# Patient Record
Sex: FEMALE | Race: WHITE | URBAN - NONMETROPOLITAN AREA
[De-identification: names, ages, dates, MRNs, and addresses within clinical notes are randomized per-mention and may not be internally consistent; named-entity substitution may affect disease eponyms.]

---

## 2020-09-22 ENCOUNTER — OFFICE VISIT (OUTPATIENT)
Dept: CARDIOLOGY CLINIC | Age: 60
End: 2020-09-22
Payer: COMMERCIAL

## 2020-09-22 VITALS
DIASTOLIC BLOOD PRESSURE: 64 MMHG | HEART RATE: 80 BPM | WEIGHT: 134.8 LBS | SYSTOLIC BLOOD PRESSURE: 122 MMHG | HEIGHT: 65 IN | BODY MASS INDEX: 22.46 KG/M2

## 2020-09-22 PROCEDURE — 99204 OFFICE O/P NEW MOD 45 MIN: CPT | Performed by: NUCLEAR MEDICINE

## 2020-09-22 RX ORDER — CYCLOBENZAPRINE HCL 10 MG
5 TABLET ORAL
COMMUNITY
Start: 2020-07-23

## 2020-09-22 RX ORDER — TRIAMCINOLONE ACETONIDE 1 MG/G
CREAM TOPICAL 2 TIMES DAILY
COMMUNITY
End: 2022-04-05 | Stop reason: ALTCHOICE

## 2020-09-22 RX ORDER — M-VIT,TX,IRON,MINS/CALC/FOLIC 27MG-0.4MG
1 TABLET ORAL DAILY
COMMUNITY

## 2020-09-22 RX ORDER — FEXOFENADINE HCL 180 MG/1
180 TABLET ORAL DAILY
COMMUNITY

## 2020-09-22 RX ORDER — PANTOPRAZOLE SODIUM 40 MG/1
40 TABLET, DELAYED RELEASE ORAL DAILY
COMMUNITY
Start: 2020-08-29

## 2020-09-22 RX ORDER — OMEGA-3/DHA/EPA/FISH OIL 60 MG-90MG
1500 CAPSULE ORAL DAILY
COMMUNITY

## 2020-09-22 RX ORDER — THIAMINE HCL 100 MG
TABLET ORAL DAILY
COMMUNITY

## 2020-09-22 ASSESSMENT — ENCOUNTER SYMPTOMS
NAUSEA: 0
CONSTIPATION: 0
ANAL BLEEDING: 0
SHORTNESS OF BREATH: 1
RECTAL PAIN: 0
DIARRHEA: 0
VOMITING: 0
BACK PAIN: 0
ABDOMINAL PAIN: 0
ABDOMINAL DISTENTION: 0
BLOOD IN STOOL: 0
COLOR CHANGE: 0
CHEST TIGHTNESS: 0
PHOTOPHOBIA: 0

## 2020-09-22 NOTE — PROGRESS NOTES
[address] [place] [address],[address] [address] [facility]  Dept: [phone]  Dept Fax: [phone]  Loc: [phone]    Visit Date: 9/22/2020    Estefani Rojas is a 56 y.o. female who presents todayfor:  Chief Complaint   Patient presents with    New Patient    Establish Cardiologist    Palpitations   here for the first time  Started having palpitation   Been going on for few years  Might have gotten worse lately   Was on beta blockers for migraine  Then was stopped  Lately had an echo   Was normal   Mild valve issues  holter with occasional to rare PACs and PVCs  Palpitation is not frequent   Not too bad on caffeine  Not to bad on alcohol  No known CAD  No smoking         HPI:  HPI  Past Medical History:   Diagnosis Date    Cancer ([address]      skin; melanoma in situ 1984, 2009. Early lymph node -    Migraines       Past Surgical History:   Procedure Laterality Date    BUNIONECTOMY Left 2000    COLONOS[facility]      [facility] age 46    ENDOSCOPY, COLON, DIAGNOSTIC      TONSILLECTOMY      age 9     Family History   Problem Relation Age of Onset    Stroke Father     Heart Disease Brother     Other Brother         anxiety     Social History     Tobacco Use    Smoking status: Never Smoker    Smokeless tobacco: Never Used   Substance Use Topics    Alcohol use: Yes     Comment: rarely, 2/month      Current Outpatient Medications   Medication Sig Dispense Refill    Magnesium 500 MG TABS Take by mouth daily      Omega-3 Fatty Acids (FISH OIL) 500 MG CAPS Take 1,500 mg by mouth daily      Multiple Vitamins-Minerals (THERAPEUTIC MULTIVITAMIN-MINERALS) tablet Take 1 tablet by mouth daily      triamcinolone (KENALOG) 0.1 % cream Apply topically 2 times daily Apply topically 2 times daily.       cyclobenzaprine (FLEXERIL) 10 MG tablet Take 5 mg by mouth       TRIAMCINOLONE ACETONIDE,NASAL, NA 55 mcg by Nasal route 2 times daily      fexofenadine (ALLEGRA) 180 MG tablet Take 180 mg by mouth daily      pantoprazole (PROTONIX) 40 MG tablet Take 40 mg by mouth daily       metoprolol tartrate (LOPRESSOR) 25 MG tablet Take 12.5 mg by mouth 2 times daily       NONFORMULARY synbiotic 365      NONFORMULARY Enzyme 365      NONFORMULARY Gut connect 365      conjugated estrogens (PREMARIN) 0.625 MG/GM vaginal cream Place vaginally daily Place vaginally daily. No current facility-administered medications for this visit. Allergies   Allergen Reactions    Flagyl [Metronidazole] Rash     Health Maintenance   Topic Date Due    Hepatitis C screen  1960    HIV screen  05/24/1973    DTaP/Tdap/Td vaccine (1 - Tdap) 05/24/1977    Cervical cancer screen  05/24/1979    Lipid screen  05/24/1998    Breast cancer screen  05/24/2008    Shingles Vaccine (1 of 2) 05/24/2008    Colon cancer screen colonoscopy  05/24/2008    Flu vaccine (1) 09/01/2020    Hepatitis A vaccine  Aged Out    Hepatitis B vaccine  Aged Out    Hib vaccine  Aged Out    Meningococcal (ACWY) vaccine  Aged Out    Pneumococcal 0-64 years Vaccine  Aged Out       Subjective:  Review of Systems   Constitutional: Positive for fatigue. Negative for chills and diaphoresis. HENT: Negative for ear pain and nosebleeds. Eyes: Negative for photophobia. Respiratory: Positive for shortness of breath. Negative for chest tightness. Cardiovascular: Positive for palpitations. Negative for chest pain. Gastrointestinal: Negative for abdominal distention, abdominal pain, anal bleeding, blood in stool, constipation, diarrhea, nausea, rectal pain and vomiting. Endocrine: Negative for polyphagia. Genitourinary: Negative for dysuria, hematuria and urgency. Musculoskeletal: Negative for arthralgias, back pain, gait problem, joint swelling, myalgias, neck pain and neck stiffness. Skin: Negative for color change, pallor, rash and wound. Allergic/Immunologic: Negative for food allergies.    Neurological: file.  Discussed   Cut down caffeine  No indication for more testing   Monitor for now   No obvious arrhythmia  Will see in a year   Continue risk factor modification and medical management  Thank you for allowing me to participate in the care of your patient. Please don't hesitate to contact me regarding any further issues related to the patient care    Orders Placed:  No orders of the defined types were placed in this encounter. Medications Prescribed:  No orders of the defined types were placed in this encounter. Discussed use, benefit, and side effects of prescribed medications. All patient questions answered. Pt voicedunderstanding. Instructed to continue current medications, diet and exercise. Continue risk factor modification and medical management. Patient agreed with treatment plan. Follow up as directed.     Electronically signedby [address], MD on 9/22/2020 at 3:15 PM

## 2020-10-26 ENCOUNTER — NURSE ONLY (OUTPATIENT)
Dept: LAB | Age: 60
End: 2020-10-26

## 2020-12-18 ENCOUNTER — TELEPHONE (OUTPATIENT)
Dept: CARDIOLOGY CLINIC | Age: 60
End: 2020-12-18

## 2022-04-05 ENCOUNTER — OFFICE VISIT (OUTPATIENT)
Dept: CARDIOLOGY CLINIC | Age: 62
End: 2022-04-05
Payer: COMMERCIAL

## 2022-04-05 VITALS
HEIGHT: 65 IN | HEART RATE: 73 BPM | DIASTOLIC BLOOD PRESSURE: 80 MMHG | SYSTOLIC BLOOD PRESSURE: 126 MMHG | BODY MASS INDEX: 24.49 KG/M2 | WEIGHT: 147 LBS

## 2022-04-05 DIAGNOSIS — R00.2 PALPITATION: Primary | ICD-10-CM

## 2022-04-05 PROCEDURE — 99213 OFFICE O/P EST LOW 20 MIN: CPT | Performed by: NUCLEAR MEDICINE

## 2022-04-05 PROCEDURE — 93000 ELECTROCARDIOGRAM COMPLETE: CPT | Performed by: NUCLEAR MEDICINE

## 2022-04-05 RX ORDER — RABEPRAZOLE SODIUM 20 MG/1
TABLET, DELAYED RELEASE ORAL
COMMUNITY
Start: 2021-10-08

## 2022-04-05 NOTE — PROGRESS NOTES
[facility]  [address] [place] [address]  Dept: [phone]  Dept Fax: [phone]  Loc: [phone]    Visit Date: 4/5/2022    Kalie Aguayo is a 59 y.o. female who presents todayfor:  Chief Complaint   Patient presents with    Check-Up     EKG done today    Palpitations     Here for follow up   Had palpitation   Better now  No more issues  No chest pain per se  Some times heart the heart beats in the ears  Vascular screen was okay  No new symptoms other wise     HPI:  HPI  Past Medical History:   Diagnosis Date    [address] [address]      skin; melanoma in situ 1984, 2009. Early lymph node -    Migraines       Past Surgical History:   Procedure Laterality Date    BUNIONECTOMY Left 2000    COLONOSCOPY      [facility] age 46    ENDOSCOPY, COLON, DIAGNOSTIC      TONSILLECTOMY      age 9     Family History   Problem Relation Age of Onset    Stroke Father     Heart Disease Brother     Other Brother         anxiety     Social History     Tobacco Use    Smoking status: Never Smoker    Smokeless tobacco: Never Used   Substance Use Topics    Alcohol use: Yes     Comment: rarely, 2/month      Current Outpatient Medications   Medication Sig Dispense Refill    RABEprazole (ACIPHEX) 20 MG tablet Take by mouth      NONFORMULARY Clobetasol ointment      Probiotic Product (PROBIOTIC DAILY PO) Take by mouth      Omega-3 Fatty Acids (FISH OIL) 500 MG CAPS Take 1,500 mg by mouth daily      Multiple Vitamins-Minerals (THERAPEUTIC MULTIVITAMIN-MINERALS) tablet Take 1 tablet by mouth daily      cyclobenzaprine (FLEXERIL) 10 MG tablet Take 5 mg by mouth       fexofenadine (ALLEGRA) 180 MG tablet Take 180 mg by mouth daily      metoprolol tartrate (LOPRESSOR) 25 MG tablet Take 12.5 mg by mouth 2 times daily       conjugated estrogens (PREMARIN) 0.625 MG/GM vaginal cream Place vaginally daily Place vaginally daily.       Magnesium 500 MG TABS Take by mouth daily (Patient not taking: Reported on 4/5/2022)      pantoprazole (PROTONIX) 40 MG tablet Take 40 mg by mouth daily        No current facility-administered medications for this visit. Allergies   Allergen Reactions    Flagyl [Metronidazole] Rash     Health Maintenance   Topic Date Due    Hepatitis C screen  Never done    COVID-19 Vaccine (1) Never done    Depression Screen  Never done    HIV screen  Never done    Cervical cancer screen  Never done    Lipid screen  Never done    Breast cancer screen  Never done    Flu vaccine (Season Ended) 09/01/2022    DTaP/Tdap/Td vaccine (2 - Td or Tdap) 10/10/2028    Colorectal Cancer Screen  09/30/2030    Shingles Vaccine  Completed    Hepatitis A vaccine  Aged Out    Hepatitis B vaccine  Aged Out    Hib vaccine  Aged Out    Meningococcal (ACWY) vaccine  Aged Out    Pneumococcal 0-64 years Vaccine  Aged Out       Subjective:  Review of Systems  General:   No fever, no chills, No fatigue or weight loss  Pulmonary:    No dyspnea, no wheezing  Cardiac:    Denies recent chest pain,   GI:     No nausea or vomiting, no abdominal pain  Neuro:    No dizziness or light headedness,   Musculoskeletal:  No recent active issues  Extremities:   No edema, no obvious claudication       Objective:  Physical Exam  /80   Pulse 73   Ht 5' 4.5\" (1.638 m)   Wt 147 lb (66.7 kg)   BMI 24.84 kg/m²   General:   Well developed, well nourished  Lungs:   Clear to auscultation  Heart:    Normal S1 S2, Slight murmur. no rubs, no gallops  Abdomen:   Soft, non tender, no organomegalies, positive bowel sounds  Extremities:   No edema, no cyanosis, good peripheral pulses  Neurological:   Awake, alert, oriented. No obvious focal deficits  Musculoskelatal:  No obvious deformities    Assessment:      Diagnosis Orders   1. Palpitation  EKG 12 Lead   as above  Cardiac seems fair for now  No major symptoms  ECG in office was done today. I reviewed the ECG.  No acute findings      Plan:  No follow-ups on file. As above  Continue risk factor modification and medical management'  Thank you for allowing me to participate in the care of your patient. Please don't hesitate to contact me regarding any further issues related to the patient care    Orders Placed:  Orders Placed This Encounter   Procedures    EKG 12 Lead     Order Specific Question:   Reason for Exam?     Answer: Other       Medications Prescribed:  No orders of the defined types were placed in this encounter. Discussed use, benefit, and side effects of prescribed medications. All patient questions answered. Pt voicedunderstanding. Instructed to continue current medications, diet and exercise. Continue risk factor modification and medical management. Patient agreed with treatment plan. Follow up as directed.     Electronically signedby Sandra Mendez MD on 4/5/2022 at 8:14 AM

## 2023-05-03 ENCOUNTER — OFFICE (OUTPATIENT)
Dept: URBAN - METROPOLITAN AREA CLINIC 18 | Facility: CLINIC | Age: 63
End: 2023-05-03
Payer: COMMERCIAL

## 2023-05-03 VITALS
HEIGHT: 65 IN | WEIGHT: 149.2 LBS | HEART RATE: 61 BPM | SYSTOLIC BLOOD PRESSURE: 130 MMHG | DIASTOLIC BLOOD PRESSURE: 78 MMHG

## 2023-05-03 DIAGNOSIS — R10.13 EPIGASTRIC PAIN: ICD-10-CM

## 2023-05-03 DIAGNOSIS — K21.9 GASTRO-ESOPHAGEAL REFLUX DISEASE WITHOUT ESOPHAGITIS: ICD-10-CM

## 2023-05-03 DIAGNOSIS — R13.10 DYSPHAGIA, UNSPECIFIED: ICD-10-CM

## 2023-05-03 PROCEDURE — 99213 OFFICE O/P EST LOW 20 MIN: CPT

## 2023-05-09 ENCOUNTER — AMBULATORY SURGICAL CENTER (OUTPATIENT)
Dept: URBAN - METROPOLITAN AREA SURGERY 7 | Facility: SURGERY | Age: 63
End: 2023-05-09

## 2023-05-09 ENCOUNTER — OFFICE (OUTPATIENT)
Dept: URBAN - METROPOLITAN AREA PATHOLOGY 1 | Facility: PATHOLOGY | Age: 63
End: 2023-05-09

## 2023-05-09 VITALS
DIASTOLIC BLOOD PRESSURE: 68 MMHG | SYSTOLIC BLOOD PRESSURE: 143 MMHG | HEART RATE: 65 BPM | SYSTOLIC BLOOD PRESSURE: 141 MMHG | RESPIRATION RATE: 18 BRPM | SYSTOLIC BLOOD PRESSURE: 143 MMHG | DIASTOLIC BLOOD PRESSURE: 68 MMHG | RESPIRATION RATE: 11 BRPM | DIASTOLIC BLOOD PRESSURE: 79 MMHG | HEART RATE: 65 BPM | OXYGEN SATURATION: 96 % | HEART RATE: 68 BPM | OXYGEN SATURATION: 100 % | RESPIRATION RATE: 11 BRPM | SYSTOLIC BLOOD PRESSURE: 126 MMHG | RESPIRATION RATE: 14 BRPM | RESPIRATION RATE: 10 BRPM | DIASTOLIC BLOOD PRESSURE: 86 MMHG | SYSTOLIC BLOOD PRESSURE: 141 MMHG | HEART RATE: 55 BPM | OXYGEN SATURATION: 97 % | OXYGEN SATURATION: 97 % | HEART RATE: 68 BPM | WEIGHT: 149 LBS | HEIGHT: 65 IN | TEMPERATURE: 97 F | SYSTOLIC BLOOD PRESSURE: 159 MMHG | SYSTOLIC BLOOD PRESSURE: 140 MMHG | OXYGEN SATURATION: 96 % | SYSTOLIC BLOOD PRESSURE: 140 MMHG | SYSTOLIC BLOOD PRESSURE: 146 MMHG | RESPIRATION RATE: 16 BRPM | RESPIRATION RATE: 18 BRPM | SYSTOLIC BLOOD PRESSURE: 159 MMHG | HEART RATE: 63 BPM | RESPIRATION RATE: 12 BRPM | OXYGEN SATURATION: 98 % | RESPIRATION RATE: 16 BRPM | DIASTOLIC BLOOD PRESSURE: 86 MMHG | TEMPERATURE: 97 F | DIASTOLIC BLOOD PRESSURE: 89 MMHG | DIASTOLIC BLOOD PRESSURE: 92 MMHG | HEART RATE: 75 BPM | OXYGEN SATURATION: 100 % | HEART RATE: 57 BPM | HEART RATE: 56 BPM | SYSTOLIC BLOOD PRESSURE: 126 MMHG | HEART RATE: 63 BPM | HEART RATE: 57 BPM | HEART RATE: 56 BPM | HEART RATE: 75 BPM | HEART RATE: 72 BPM | HEART RATE: 55 BPM | DIASTOLIC BLOOD PRESSURE: 89 MMHG | OXYGEN SATURATION: 98 % | SYSTOLIC BLOOD PRESSURE: 146 MMHG | DIASTOLIC BLOOD PRESSURE: 79 MMHG | HEART RATE: 72 BPM | RESPIRATION RATE: 12 BRPM | DIASTOLIC BLOOD PRESSURE: 92 MMHG | RESPIRATION RATE: 10 BRPM | HEIGHT: 65 IN | RESPIRATION RATE: 14 BRPM | WEIGHT: 149 LBS

## 2023-05-09 DIAGNOSIS — R13.10 DYSPHAGIA, UNSPECIFIED: ICD-10-CM

## 2023-05-09 DIAGNOSIS — K21.9 GASTRO-ESOPHAGEAL REFLUX DISEASE WITHOUT ESOPHAGITIS: ICD-10-CM

## 2023-05-09 DIAGNOSIS — K31.89 OTHER DISEASES OF STOMACH AND DUODENUM: ICD-10-CM

## 2023-05-09 PROCEDURE — 43239 EGD BIOPSY SINGLE/MULTIPLE: CPT | Performed by: INTERNAL MEDICINE

## 2023-05-09 PROCEDURE — 43248 EGD GUIDE WIRE INSERTION: CPT | Performed by: INTERNAL MEDICINE

## 2023-05-09 PROCEDURE — 88305 TISSUE EXAM BY PATHOLOGIST: CPT | Performed by: PATHOLOGY

## 2023-05-09 RX ORDER — FAMOTIDINE 40 MG/1
40 TABLET, FILM COATED ORAL
Qty: 30 | Refills: 5 | Status: COMPLETED
Start: 2023-05-09 | End: 2023-09-20

## 2023-05-11 LAB
PDF: PDF REPORT: (no result)
PDF: PDF REPORT: (no result)

## 2023-09-20 ENCOUNTER — OFFICE (OUTPATIENT)
Dept: URBAN - METROPOLITAN AREA CLINIC 18 | Facility: CLINIC | Age: 63
End: 2023-09-20

## 2023-09-20 VITALS
HEIGHT: 65 IN | HEART RATE: 62 BPM | DIASTOLIC BLOOD PRESSURE: 82 MMHG | SYSTOLIC BLOOD PRESSURE: 126 MMHG | WEIGHT: 145 LBS

## 2023-09-20 DIAGNOSIS — K21.9 GASTRO-ESOPHAGEAL REFLUX DISEASE WITHOUT ESOPHAGITIS: ICD-10-CM

## 2023-09-20 DIAGNOSIS — R13.10 DYSPHAGIA, UNSPECIFIED: ICD-10-CM

## 2023-09-20 PROCEDURE — 99213 OFFICE O/P EST LOW 20 MIN: CPT | Performed by: INTERNAL MEDICINE

## 2024-05-31 ENCOUNTER — OFFICE (OUTPATIENT)
Dept: URBAN - METROPOLITAN AREA CLINIC 16 | Facility: CLINIC | Age: 64
End: 2024-05-31
Payer: MEDICARE

## 2024-05-31 DIAGNOSIS — K21.9 GASTRO-ESOPHAGEAL REFLUX DISEASE WITHOUT ESOPHAGITIS: ICD-10-CM

## 2024-05-31 PROCEDURE — 99426 PRIN CARE MGMT STAFF 1ST 30: CPT

## 2024-07-31 ENCOUNTER — OFFICE (OUTPATIENT)
Dept: URBAN - METROPOLITAN AREA CLINIC 16 | Facility: CLINIC | Age: 64
End: 2024-07-31
Payer: MEDICARE

## 2024-07-31 DIAGNOSIS — K21.9 GASTRO-ESOPHAGEAL REFLUX DISEASE WITHOUT ESOPHAGITIS: ICD-10-CM

## 2024-07-31 PROCEDURE — 99426 PRIN CARE MGMT STAFF 1ST 30: CPT
